# Patient Record
Sex: MALE | Race: WHITE
[De-identification: names, ages, dates, MRNs, and addresses within clinical notes are randomized per-mention and may not be internally consistent; named-entity substitution may affect disease eponyms.]

---
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
## 2023-10-20 ENCOUNTER — DOCTOR'S OFFICE (OUTPATIENT)
Dept: URBAN - METROPOLITAN AREA CLINIC 163 | Facility: CLINIC | Age: 23
Setting detail: OPHTHALMOLOGY
End: 2023-10-20
Payer: MEDICARE

## 2023-10-20 DIAGNOSIS — H01.004: ICD-10-CM

## 2023-10-20 DIAGNOSIS — D23.122: ICD-10-CM

## 2023-10-20 DIAGNOSIS — H01.001: ICD-10-CM

## 2023-10-20 PROCEDURE — 92004 COMPRE OPH EXAM NEW PT 1/>: CPT | Performed by: OPTOMETRIST

## 2023-10-20 ASSESSMENT — REFRACTION_AUTOREFRACTION
OD_AXIS: 113
OS_SPHERE: +0.25
OD_CYLINDER: +0.25
OS_CYLINDER: SPH
OD_SPHERE: PLANO

## 2023-10-20 ASSESSMENT — CONFRONTATIONAL VISUAL FIELD TEST (CVF)
OS_FINDINGS: FULL
OD_FINDINGS: FULL

## 2023-10-20 ASSESSMENT — LID EXAM ASSESSMENTS
OS_BLEPHARITIS: LUL T
OD_BLEPHARITIS: RUL T

## 2023-10-20 ASSESSMENT — VISUAL ACUITY
OD_BCVA: 20/20-2
OS_BCVA: 20/20-

## 2024-02-05 ENCOUNTER — DOCTOR'S OFFICE (OUTPATIENT)
Dept: URBAN - METROPOLITAN AREA CLINIC 163 | Facility: CLINIC | Age: 24
Setting detail: OPHTHALMOLOGY
End: 2024-02-05
Payer: MEDICARE

## 2024-02-05 DIAGNOSIS — D23.122: ICD-10-CM

## 2024-02-05 DIAGNOSIS — H01.004: ICD-10-CM

## 2024-02-05 DIAGNOSIS — H01.001: ICD-10-CM

## 2024-02-05 PROCEDURE — 92012 INTRM OPH EXAM EST PATIENT: CPT | Performed by: OPTOMETRIST

## 2024-02-05 ASSESSMENT — LID EXAM ASSESSMENTS
OD_BLEPHARITIS: RUL T
OS_BLEPHARITIS: LUL T

## 2024-02-05 ASSESSMENT — REFRACTION_AUTOREFRACTION
OS_CYLINDER: SPH
OD_AXIS: 113
OS_SPHERE: +0.25
OD_SPHERE: PLANO
OD_CYLINDER: +0.25

## 2024-02-05 ASSESSMENT — CONFRONTATIONAL VISUAL FIELD TEST (CVF)
OD_FINDINGS: FULL
OS_FINDINGS: FULL

## 2024-02-20 ENCOUNTER — DOCTOR'S OFFICE (OUTPATIENT)
Dept: URBAN - METROPOLITAN AREA CLINIC 163 | Facility: CLINIC | Age: 24
Setting detail: OPHTHALMOLOGY
End: 2024-02-20
Payer: MEDICARE

## 2024-02-20 DIAGNOSIS — D23.122: ICD-10-CM

## 2024-02-20 PROCEDURE — 67840 REMOVE EYELID LESION: CPT | Mod: E2 | Performed by: OPHTHALMOLOGY

## 2024-02-20 ASSESSMENT — LID EXAM ASSESSMENTS
OD_BLEPHARITIS: RUL T
OS_BLEPHARITIS: LUL T

## 2024-02-20 ASSESSMENT — REFRACTION_AUTOREFRACTION
OD_AXIS: 113
OS_CYLINDER: SPH
OS_SPHERE: +0.25
OD_CYLINDER: +0.25
OD_SPHERE: PLANO

## 2024-02-20 ASSESSMENT — CONFRONTATIONAL VISUAL FIELD TEST (CVF)
OD_FINDINGS: FULL
OS_FINDINGS: FULL